# Patient Record
Sex: MALE | Race: WHITE | Employment: UNEMPLOYED | URBAN - METROPOLITAN AREA
[De-identification: names, ages, dates, MRNs, and addresses within clinical notes are randomized per-mention and may not be internally consistent; named-entity substitution may affect disease eponyms.]

---
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
## 2018-02-26 ENCOUNTER — OFFICE VISIT (OUTPATIENT)
Dept: INTERNAL MEDICINE CLINIC | Facility: CLINIC | Age: 18
End: 2018-02-26

## 2018-02-26 VITALS
HEIGHT: 68 IN | DIASTOLIC BLOOD PRESSURE: 66 MMHG | SYSTOLIC BLOOD PRESSURE: 116 MMHG | HEART RATE: 81 BPM | TEMPERATURE: 98 F | BODY MASS INDEX: 21.07 KG/M2 | OXYGEN SATURATION: 98 % | WEIGHT: 139 LBS

## 2018-02-26 DIAGNOSIS — R05.9 COUGH: ICD-10-CM

## 2018-02-26 DIAGNOSIS — Z00.00 ANNUAL PHYSICAL EXAM: Primary | ICD-10-CM

## 2018-02-26 PROCEDURE — 99385 PREV VISIT NEW AGE 18-39: CPT | Performed by: INTERNAL MEDICINE

## 2018-02-26 NOTE — PROGRESS NOTES
Sravani Wolfe is a 21year old malePatient presents with:  Consult: New Patient   Cough: Complaints cough, sore throat - since 1 1/2 years = has seen physician in immediate care       HPI:     Sravani Wolfe is a 21year old male who presents for a comple dizziness or focal weakness  PSYCHE: denies anxiety or depression  SKIN: denies lesions, rashes or wounds    EXAM:   /66 (BP Location: Left arm, Patient Position: Sitting, Cuff Size: adult)   Pulse 81   Temp 98.3 °F (36.8 °C) (Oral)   Ht 5' 8\" (1.72

## 2018-07-26 ENCOUNTER — OFFICE VISIT (OUTPATIENT)
Dept: INTERNAL MEDICINE CLINIC | Facility: CLINIC | Age: 18
End: 2018-07-26
Payer: COMMERCIAL

## 2018-07-26 VITALS
SYSTOLIC BLOOD PRESSURE: 118 MMHG | WEIGHT: 127 LBS | OXYGEN SATURATION: 100 % | HEIGHT: 68 IN | DIASTOLIC BLOOD PRESSURE: 66 MMHG | HEART RATE: 62 BPM | TEMPERATURE: 98 F | BODY MASS INDEX: 19.25 KG/M2

## 2018-07-26 DIAGNOSIS — R05.9 COUGH: Primary | ICD-10-CM

## 2018-07-26 DIAGNOSIS — R31.9 HEMATURIA, UNSPECIFIED TYPE: ICD-10-CM

## 2018-07-26 PROCEDURE — 99212 OFFICE O/P EST SF 10 MIN: CPT | Performed by: INTERNAL MEDICINE

## 2018-07-26 PROCEDURE — 99214 OFFICE O/P EST MOD 30 MIN: CPT | Performed by: INTERNAL MEDICINE

## 2018-07-26 NOTE — PROGRESS NOTES
Iverson Spurling is a 21year old male. Patient presents with:  Cough: Complaints of productive cough w/ brown/green phlegm   Hematuria: Complaints of intermittent gross hematuria - since couple years - has never mentioned it to any provider.  Has noticed shireen Left arm, Patient Position: Sitting, Cuff Size: adult)   Pulse 62   Temp 98 °F (36.7 °C) (Oral)   Ht 5' 8\" (1.727 m)   Wt 127 lb (57.6 kg)   SpO2 100%   BMI 19.31 kg/m²     GENERAL: well developed, well nourished, in no apparent distress  EYES: PERRLA, EO

## 2018-07-30 ENCOUNTER — LAB ENCOUNTER (OUTPATIENT)
Dept: LAB | Facility: HOSPITAL | Age: 18
End: 2018-07-30
Attending: INTERNAL MEDICINE
Payer: COMMERCIAL

## 2018-07-30 ENCOUNTER — HOSPITAL ENCOUNTER (OUTPATIENT)
Dept: GENERAL RADIOLOGY | Facility: HOSPITAL | Age: 18
Discharge: HOME | End: 2018-07-30
Attending: INTERNAL MEDICINE
Payer: COMMERCIAL

## 2018-07-30 DIAGNOSIS — R31.9 HEMATURIA, UNSPECIFIED TYPE: ICD-10-CM

## 2018-07-30 DIAGNOSIS — R05.9 COUGH: ICD-10-CM

## 2018-07-30 LAB
ANION GAP SERPL CALC-SCNC: 8 MMOL/L (ref 0–18)
BACTERIA UR QL AUTO: NEGATIVE /HPF
BASOPHILS # BLD: 0 K/UL (ref 0–0.2)
BASOPHILS NFR BLD: 1 %
BILIRUB UR QL: NEGATIVE
BUN SERPL-MCNC: 6 MG/DL (ref 8–20)
BUN/CREAT SERPL: 5.5 (ref 10–20)
CALCIUM SERPL-MCNC: 9.4 MG/DL (ref 8.5–10.5)
CHLORIDE SERPL-SCNC: 103 MMOL/L (ref 95–110)
CLARITY UR: CLEAR
CO2 SERPL-SCNC: 26 MMOL/L (ref 22–32)
COLOR UR: YELLOW
CREAT SERPL-MCNC: 1.09 MG/DL (ref 0.5–1.5)
EOSINOPHIL # BLD: 0.2 K/UL (ref 0–0.7)
EOSINOPHIL NFR BLD: 3 %
ERYTHROCYTE [DISTWIDTH] IN BLOOD BY AUTOMATED COUNT: 13.6 % (ref 11–15)
GLUCOSE SERPL-MCNC: 145 MG/DL (ref 70–99)
GLUCOSE UR-MCNC: NEGATIVE MG/DL
HCT VFR BLD AUTO: 44 % (ref 41–52)
HGB BLD-MCNC: 14.6 G/DL (ref 13.5–17.5)
KETONES UR-MCNC: NEGATIVE MG/DL
LEUKOCYTE ESTERASE UR QL STRIP.AUTO: NEGATIVE
LYMPHOCYTES # BLD: 1.6 K/UL (ref 1–4)
LYMPHOCYTES NFR BLD: 27 %
MCH RBC QN AUTO: 29.6 PG (ref 27–32)
MCHC RBC AUTO-ENTMCNC: 33.3 G/DL (ref 32–37)
MCV RBC AUTO: 89.1 FL (ref 80–100)
MONOCYTES # BLD: 0.6 K/UL (ref 0–1)
MONOCYTES NFR BLD: 10 %
NEUTROPHILS # BLD AUTO: 3.4 K/UL (ref 1.8–7.7)
NEUTROPHILS NFR BLD: 59 %
NITRITE UR QL STRIP.AUTO: NEGATIVE
OSMOLALITY UR CALC.SUM OF ELEC: 284 MOSM/KG (ref 275–295)
PH UR: 7 [PH] (ref 5–8)
PLATELET # BLD AUTO: 200 K/UL (ref 140–400)
PMV BLD AUTO: 9 FL (ref 7.4–10.3)
POTASSIUM SERPL-SCNC: 3.5 MMOL/L (ref 3.3–5.1)
PROT UR-MCNC: NEGATIVE MG/DL
RBC # BLD AUTO: 4.94 M/UL (ref 4.5–5.9)
RBC #/AREA URNS AUTO: <1 /HPF
SODIUM SERPL-SCNC: 137 MMOL/L (ref 136–144)
SP GR UR STRIP: 1 (ref 1–1.03)
UROBILINOGEN UR STRIP-ACNC: <2
VIT C UR-MCNC: NEGATIVE MG/DL
WBC # BLD AUTO: 5.8 K/UL (ref 4–11)
WBC #/AREA URNS AUTO: <1 /HPF

## 2018-07-30 PROCEDURE — 71046 X-RAY EXAM CHEST 2 VIEWS: CPT | Performed by: INTERNAL MEDICINE

## 2018-07-30 PROCEDURE — 85025 COMPLETE CBC W/AUTO DIFF WBC: CPT

## 2018-07-30 PROCEDURE — 36415 COLL VENOUS BLD VENIPUNCTURE: CPT

## 2018-07-30 PROCEDURE — 81001 URINALYSIS AUTO W/SCOPE: CPT

## 2018-07-30 PROCEDURE — 80048 BASIC METABOLIC PNL TOTAL CA: CPT

## 2018-08-06 ENCOUNTER — TELEPHONE (OUTPATIENT)
Dept: INTERNAL MEDICINE CLINIC | Facility: CLINIC | Age: 18
End: 2018-08-06

## 2018-08-06 NOTE — TELEPHONE ENCOUNTER
Please notify patient that labs and xray were within normal limits; I would still recommend that hte patient see the urologist (was provided contact information in the office) and also would recommend seeing pulmonology-Dr. Savanah Asif.

## 2018-09-07 ENCOUNTER — TELEPHONE (OUTPATIENT)
Dept: PULMONOLOGY | Facility: CLINIC | Age: 18
End: 2018-09-07

## 2018-09-07 NOTE — TELEPHONE ENCOUNTER
Lev Castellano requesting pt to be seen sooner than next avail for consult - pt originally scheduled for 10/9/2018 but physician will not be in.  PLs call. Thank you.

## 2018-09-07 NOTE — TELEPHONE ENCOUNTER
Pt's mom stts he had abn CXR. Resched pt on 10/15 4:30 pm [facility]. Appt time, location, & parking given. She voiced understanding.

## 2018-09-08 ENCOUNTER — APPOINTMENT (OUTPATIENT)
Dept: LAB | Facility: HOSPITAL | Age: 18
End: 2018-09-08
Attending: UROLOGY
Payer: COMMERCIAL

## 2018-09-08 ENCOUNTER — HOSPITAL ENCOUNTER (OUTPATIENT)
Dept: CT IMAGING | Facility: HOSPITAL | Age: 18
Discharge: HOME | End: 2018-09-08
Attending: UROLOGY
Payer: COMMERCIAL

## 2018-09-08 DIAGNOSIS — R31.9 HEMATURIA: ICD-10-CM

## 2018-09-08 LAB — CREAT BLD-MCNC: 1 MG/DL (ref 0.5–1.5)

## 2018-09-08 PROCEDURE — 74178 CT ABD&PLV WO CNTR FLWD CNTR: CPT | Performed by: UROLOGY

## 2018-09-08 PROCEDURE — 82565 ASSAY OF CREATININE: CPT

## 2018-10-15 ENCOUNTER — OFFICE VISIT (OUTPATIENT)
Dept: PULMONOLOGY | Facility: CLINIC | Age: 18
End: 2018-10-15
Payer: COMMERCIAL

## 2018-10-15 VITALS
HEIGHT: 66 IN | OXYGEN SATURATION: 98 % | WEIGHT: 131 LBS | HEART RATE: 72 BPM | SYSTOLIC BLOOD PRESSURE: 138 MMHG | BODY MASS INDEX: 21.05 KG/M2 | RESPIRATION RATE: 18 BRPM | DIASTOLIC BLOOD PRESSURE: 77 MMHG

## 2018-10-15 DIAGNOSIS — R05.9 COUGH: Primary | ICD-10-CM

## 2018-10-15 PROCEDURE — 99243 OFF/OP CNSLTJ NEW/EST LOW 30: CPT | Performed by: INTERNAL MEDICINE

## 2018-10-15 PROCEDURE — 99212 OFFICE O/P EST SF 10 MIN: CPT | Performed by: INTERNAL MEDICINE

## 2018-10-15 RX ORDER — BUDESONIDE AND FORMOTEROL FUMARATE DIHYDRATE 160; 4.5 UG/1; UG/1
2 AEROSOL RESPIRATORY (INHALATION) 2 TIMES DAILY
Qty: 1 INHALER | Refills: 6 | Status: SHIPPED | OUTPATIENT
Start: 2018-10-15

## 2018-10-15 NOTE — PROGRESS NOTES
Dear  Lilly Story : As you know, Flako Steel is a 24-year-old male who I am now evaluating for cough. HISTORY OF PRESENT ILLNESS: The patient notes that he has had a chronic cough for several years.   He had smoked cigarettes on and off for 3 years and t Abdomen nontender, without hepatosplenomegaly and no mass appreciable. Extremities without clubbing cyanosis nor edema. Neurologic grossly intact with symmetric tone and strength and reflex.     LABORATORY: Chest x-ray–mild hyperinflation    ASSESSMENT AND

## 2018-10-24 ENCOUNTER — OFFICE VISIT (OUTPATIENT)
Dept: DERMATOLOGY CLINIC | Facility: CLINIC | Age: 18
End: 2018-10-24
Payer: COMMERCIAL

## 2018-10-24 DIAGNOSIS — L65.9 ALOPECIA: Primary | ICD-10-CM

## 2018-10-24 PROCEDURE — 99212 OFFICE O/P EST SF 10 MIN: CPT | Performed by: DERMATOLOGY

## 2018-10-24 PROCEDURE — 99202 OFFICE O/P NEW SF 15 MIN: CPT | Performed by: DERMATOLOGY

## 2018-10-24 RX ORDER — CLOBETASOL PROPIONATE 0.46 MG/ML
1 SOLUTION TOPICAL 2 TIMES DAILY
Qty: 50 ML | Status: SHIPPED | OUTPATIENT
Start: 2018-10-24 | End: 2019-10-24

## 2018-10-24 RX ORDER — CLOBETASOL PROPIONATE 0.46 MG/ML
1 SOLUTION TOPICAL 2 TIMES DAILY
Qty: 50 ML | Refills: 6 | Status: SHIPPED | OUTPATIENT
Start: 2018-10-24 | End: 2019-01-21 | Stop reason: ALTCHOICE

## 2018-10-24 NOTE — PATIENT INSTRUCTIONS
B. vitamin supplementation--b complex vitamin   biotin to 10 mg daily,   vitamin D3 2000 units daily,   adequate protein intake (40 to 80 g daily)  Stop Smoking  use of volumizing shampoos and antidandruff shampoos as appropriate.    Use of minoxidil 5% fo

## 2018-11-04 NOTE — PROGRESS NOTES
Lucio Schrader is a 19year old male. Patient presents with:  Derm Problem: New pt c/o thinning hair, receding hairline R upper forehead. Pt does admit to greater stress. No prev tx.              Dairy Products    Current Outpatient Medications:  Melba Not on file      Years of education: Not on file      Highest education level: Not on file    Social Needs      Financial resource strain: Not on file      Food insecurity - worry: Not on file      Food insecurity - inability: Not on file      Transportati legs, palms. Remarkable for lesions as noted patchy areas of decreased hair density especially along the anterior hairline bitemporal recession. Some decreased density over the anterior scalp crown and upper parietal scalp as well.   Hairs of numerous oriana Encounter      TSH W Reflex To Free T4      Ferritin      Vitamin D, 25-Hydroxy [E]      BILL, Direct, Reflex Titer + Spec AB      Meds & Refills for this Visit:   Requested Prescriptions     Signed Prescriptions Disp Refills   • Clobetasol Propionate 0.05

## 2018-11-19 ENCOUNTER — TELEPHONE (OUTPATIENT)
Dept: PULMONOLOGY | Facility: CLINIC | Age: 18
End: 2018-11-19

## 2018-11-19 RX ORDER — AZITHROMYCIN 250 MG/1
TABLET, FILM COATED ORAL
Qty: 6 TABLET | Refills: 0 | Status: SHIPPED | OUTPATIENT
Start: 2018-11-19 | End: 2019-01-21 | Stop reason: ALTCHOICE

## 2018-11-19 NOTE — TELEPHONE ENCOUNTER
Pt stts he was to call to provide an update few wks ago, but didn't have time to call. He stts has been using Symbicort two puffs bid, but it seems to have only made small difference. Pt stts he is using ProAir & Allegra prn.  Per pt hasn't had Alpha-1-Anti

## 2018-11-20 NOTE — TELEPHONE ENCOUNTER
I spoke with the patient regarding his predicament. I sent in a [place]. He will call in a week to give an update. If the clinical syndrome is ongoing, I will send off blood testing to screen for cystic fibrosis genetics.

## 2018-11-29 ENCOUNTER — LAB ENCOUNTER (OUTPATIENT)
Dept: LAB | Facility: HOSPITAL | Age: 18
End: 2018-11-29
Attending: DERMATOLOGY
Payer: COMMERCIAL

## 2018-11-29 DIAGNOSIS — L65.9 ALOPECIA: ICD-10-CM

## 2018-11-29 DIAGNOSIS — R05.9 COUGH: ICD-10-CM

## 2018-11-29 PROCEDURE — 82306 VITAMIN D 25 HYDROXY: CPT

## 2018-11-29 PROCEDURE — 82728 ASSAY OF FERRITIN: CPT

## 2018-11-29 PROCEDURE — 86038 ANTINUCLEAR ANTIBODIES: CPT

## 2018-11-29 PROCEDURE — 82103 ALPHA-1-ANTITRYPSIN TOTAL: CPT

## 2018-11-29 PROCEDURE — 36415 COLL VENOUS BLD VENIPUNCTURE: CPT

## 2018-11-29 PROCEDURE — 84443 ASSAY THYROID STIM HORMONE: CPT

## 2018-12-03 ENCOUNTER — TELEPHONE (OUTPATIENT)
Dept: PULMONOLOGY | Facility: CLINIC | Age: 18
End: 2018-12-03

## 2018-12-03 NOTE — TELEPHONE ENCOUNTER
----- Message from Jairo Godinez MD sent at 12/2/2018  9:51 AM CST -----  RN, please call the patient to let him know that his alpha-1 antitrypsin level is normal.

## 2019-01-21 ENCOUNTER — OFFICE VISIT (OUTPATIENT)
Dept: ALLERGY | Facility: CLINIC | Age: 19
End: 2019-01-21
Payer: COMMERCIAL

## 2019-01-21 ENCOUNTER — NURSE ONLY (OUTPATIENT)
Dept: ALLERGY | Facility: CLINIC | Age: 19
End: 2019-01-21
Payer: COMMERCIAL

## 2019-01-21 VITALS
HEART RATE: 55 BPM | DIASTOLIC BLOOD PRESSURE: 76 MMHG | TEMPERATURE: 98 F | BODY MASS INDEX: 22.08 KG/M2 | OXYGEN SATURATION: 98 % | SYSTOLIC BLOOD PRESSURE: 129 MMHG | WEIGHT: 139 LBS | HEIGHT: 66.5 IN

## 2019-01-21 DIAGNOSIS — J30.2 PERENNIAL ALLERGIC RHINITIS WITH SEASONAL VARIATION: ICD-10-CM

## 2019-01-21 DIAGNOSIS — Z91.018 FOOD ALLERGY: ICD-10-CM

## 2019-01-21 DIAGNOSIS — J45.40 MODERATE PERSISTENT EXTRINSIC ASTHMA WITHOUT COMPLICATION: ICD-10-CM

## 2019-01-21 DIAGNOSIS — J30.2 PERENNIAL ALLERGIC RHINITIS WITH SEASONAL VARIATION: Primary | ICD-10-CM

## 2019-01-21 DIAGNOSIS — J30.89 PERENNIAL ALLERGIC RHINITIS WITH SEASONAL VARIATION: Primary | ICD-10-CM

## 2019-01-21 DIAGNOSIS — J30.89 PERENNIAL ALLERGIC RHINITIS WITH SEASONAL VARIATION: ICD-10-CM

## 2019-01-21 PROCEDURE — 95024 IQ TESTS W/ALLERGENIC XTRCS: CPT | Performed by: ALLERGY & IMMUNOLOGY

## 2019-01-21 PROCEDURE — 94010 BREATHING CAPACITY TEST: CPT | Performed by: ALLERGY & IMMUNOLOGY

## 2019-01-21 PROCEDURE — 99212 OFFICE O/P EST SF 10 MIN: CPT | Performed by: ALLERGY & IMMUNOLOGY

## 2019-01-21 PROCEDURE — 99244 OFF/OP CNSLTJ NEW/EST MOD 40: CPT | Performed by: ALLERGY & IMMUNOLOGY

## 2019-01-21 PROCEDURE — 95004 PERQ TESTS W/ALRGNC XTRCS: CPT | Performed by: ALLERGY & IMMUNOLOGY

## 2019-01-21 NOTE — PATIENT INSTRUCTIONS
1. AR  Year-round symptoms that can worsen in the wintertime. Patient reports using Allegra with good control at this time. Looking to identify triggers.     Recs: Handouts on allergies and avoidance measures provided and reviewed including the potential

## 2019-01-21 NOTE — PROGRESS NOTES
Chantelle Patrick is a 19year old male. HPI:   Patient presents with: Allergies: Establishing care    Patient is a 64-year-old male who presents for allergy consultation upon referral of his PCP, Dr. Odessa Fall  with a chief complaint of allergies.      Today p (CST): Nick Trejo MD on 7/30/2018 at 15:06            Defers ait. HISTORY:  Past Medical History:   Diagnosis Date   • Asthma       History reviewed. No pertinent surgical history.    Family History   Family history unknown: Yes      Social Histo seizures  Psychiatric:  Negative for inappropriate interaction and psychiatric symptoms  Respiratory:   See hpi       PHYSICAL EXAM:   Constitutional: responsive, no acute distress noted  Head/Face: NC/Atraumatic  Eyes/Vision: conjunctiva and lids are norm signs and symptoms of persistent asthma including the rules of 2  Continue with Symbicort 160/4.52 puffs twice a day.   Rinse mouth after using  Consider adding Singulair if refractory      3.  food allergy  Avoids milk but tolerates the baked/cooked forms

## 2019-11-12 PROBLEM — R31.1 BENIGN ESSENTIAL MICROSCOPIC HEMATURIA: Status: ACTIVE | Noted: 2019-11-12

## 2019-11-12 NOTE — ED NOTES
Pt admits to vynase and drinking alcohol on Friday, marijuana use last week. Pt does not want anyone called at this time, does not want any visitors.

## 2019-11-12 NOTE — ED PROVIDER NOTES
Patient Seen in: [facility] AND [facility] Emergency Department      History   Patient presents with:  Eval-P (psychiatric)    Stated Complaint: SI    HPI    This is a 22year old male with depression who presents for evaluation of suicidal thoughts.   He develop and regular rhythm. Heart sounds: Normal heart sounds. Pulmonary:      Effort: Pulmonary effort is normal.      Breath sounds: Normal breath sounds. Abdominal:      General: There is no distension. Palpations: Abdomen is soft.       Tenderness reviewed. Patient has hematuria, no abdominal pain or trauma history. Advise outpatient repeat UA check by his PMD.    Inpatient certificate completed for psychiatric admission. Patient medically cleared.         Disposition and Plan     Clinical Impression

## 2019-11-12 NOTE — ED NOTES
1:1 direct observation taken over by this ER-T. Dinner tray ordered. Pt calm and cooperative watching T.V. No further needs at this time.

## 2019-11-13 PROBLEM — F32.9 MDD (MAJOR DEPRESSIVE DISORDER): Status: ACTIVE | Noted: 2019-11-13

## 2019-11-13 NOTE — ED NOTES
Spoke with [facility] and notified patient his assessment will be around 1900. Pt understands. Awaiting dinner tray.

## 2019-11-13 NOTE — BH LEVEL OF CARE ASSESSMENT
Level of Care Assessment Note    General Questions  Why are you here?: \"My therapist sent me here. I had SI thoughts. ..to kill myself.  I had Tylenol and told them I have access to a gun\"  Precipitating Events: Patient is unable to identify an exact preci (past 30 days): No  6. Have you ever done anything, started to do anything, or prepared to do anything to end your life? (lifetime): No  Score -  OV: 7 - High Risk   Describe : Patient had a plan to overdose on Tylenol or shoot himself with a gun.    Is y Attacks: Patient denies panic attacks   Trauma Reaction: Other(Patient denies trauma)  Bipolar Symptoms: No problems reported or observed  Sleep Pattern: Other (comment)(Patient states that he usually gets about 7 hours and sometimes he is sleeping more ab Patient did not specify but states that he used to drink on the weekends  Is your current use the most/worst it has ever been? : No    Illicit and Prescription Drug Use  Which if any illicit/prescription drugs have you used/abused?: Cannabis;Cocaine Powder denies  Current Withdrawal Symptoms: No    Compulsive Behaviors  Are you/others concerned about any of the following behaviors over the past 30 days?: Denies                                              Functional Impairment  Currently Attending School: No judgment as evidenced by: Patient hasn't been in treatment in awhile and sxs have been worsening   Thought Patterns  Clarity/Relevance: Coherent  Flow: Organized  Content: Ordinary  Level of Consciousness: Alert  Level of Consciousness: Alert  Behavior  Ex Disorder, Recurrent Episode  Depressive Disorder Recurrent Episode: Unspecified  Secondary Psychiatric Diagnoses  Anxiety Disorders: Unspecified Anxiety Disorder                            SRAT Review  Behavioral Precautions: Suicide  Medical Precautions:

## 2019-11-13 NOTE — ED NOTES
[facility] should be arriving in the next 30 minutes. Original Master Torresor was placed on patient's chart.

## 2019-11-13 NOTE — ED NOTES
Dr Brayden Aranda accepts pt to [address]. Report can be called to x [phone]. Include original petiton and certificate.

## 2019-11-13 NOTE — ED NOTES
Care assumed of this pt. Updated on plan of care, resting on cart. Appears in NAD.  Sitter at bedside

## 2019-11-13 NOTE — ED NOTES
Patient ate 100% of dinner tray. Provided two more warm blankets. Updated patient that Pedro Park will be in shortly. Pt is calm and cooperative.

## 2019-11-14 PROBLEM — F33.2 SEVERE RECURRENT MAJOR DEPRESSION WITHOUT PSYCHOTIC FEATURES (HCC): Status: ACTIVE | Noted: 2019-11-13

## 2022-04-07 NOTE — ED INITIAL ASSESSMENT (HPI)
Last drank at 3am. 4-5 tall boys day per patient  Notes he wants to detox from alcohol. Notes hx of withdrawals (tremors) if not drinking.     No SI/HI

## (undated) NOTE — LETTER
11/21/2018              Shekhar lGez        [address] [facility] [address]         Dear Luis Luther,    [facility] records indicate that the alpha-1-antitrypsin lab test ordered for you by Brown Carr MD  have not been done.   If you have, in fact